# Patient Record
Sex: MALE | Race: WHITE | NOT HISPANIC OR LATINO | Employment: OTHER | URBAN - METROPOLITAN AREA
[De-identification: names, ages, dates, MRNs, and addresses within clinical notes are randomized per-mention and may not be internally consistent; named-entity substitution may affect disease eponyms.]

---

## 2019-01-08 ENCOUNTER — OFFICE VISIT (OUTPATIENT)
Dept: OTOLARYNGOLOGY | Facility: CLINIC | Age: 69
End: 2019-01-08
Payer: COMMERCIAL

## 2019-01-08 VITALS
WEIGHT: 167.8 LBS | DIASTOLIC BLOOD PRESSURE: 82 MMHG | BODY MASS INDEX: 24.02 KG/M2 | HEIGHT: 70 IN | SYSTOLIC BLOOD PRESSURE: 142 MMHG

## 2019-01-08 DIAGNOSIS — H61.23 BILATERAL IMPACTED CERUMEN: ICD-10-CM

## 2019-01-08 DIAGNOSIS — H91.91 HEARING LOSS OF RIGHT EAR, UNSPECIFIED HEARING LOSS TYPE: Primary | ICD-10-CM

## 2019-01-08 DIAGNOSIS — H90.3 SENSORINEURAL HEARING LOSS (SNHL), BILATERAL: ICD-10-CM

## 2019-01-08 PROCEDURE — 99203 OFFICE O/P NEW LOW 30 MIN: CPT | Performed by: SPECIALIST

## 2019-01-08 PROCEDURE — 69210 REMOVE IMPACTED EAR WAX UNI: CPT | Performed by: NURSE PRACTITIONER

## 2019-01-08 RX ORDER — AMLODIPINE BESYLATE AND BENAZEPRIL HYDROCHLORIDE 10; 40 MG/1; MG/1
CAPSULE ORAL
COMMUNITY
Start: 2018-11-10

## 2019-01-08 RX ORDER — PHENOL 1.4 %
600 AEROSOL, SPRAY (ML) MUCOUS MEMBRANE 2 TIMES DAILY WITH MEALS
COMMUNITY

## 2019-01-08 RX ORDER — ETODOLAC 500 MG/1
TABLET, FILM COATED ORAL
COMMUNITY
Start: 2019-01-08

## 2019-01-08 RX ORDER — SIMVASTATIN 20 MG
TABLET ORAL
COMMUNITY
Start: 2018-10-18

## 2019-01-08 RX ORDER — MULTIVIT-MIN/IRON FUM/FOLIC AC 7.5 MG-4
1 TABLET ORAL DAILY
COMMUNITY

## 2019-01-08 NOTE — ASSESSMENT & PLAN NOTE
Bilateral cerumen impaction removed with alligator forceps and suction  Follow up annually for cerumen maintenance

## 2019-01-08 NOTE — PROGRESS NOTES
Assessment/Plan:    Sensorineural hearing loss (SNHL), bilateral  Continue follow up hearing aid care with [facility]   Audiogram if symptoms worsened  Bilateral impacted cerumen  Bilateral cerumen impaction removed with alligator forceps and suction  Follow up annually for cerumen maintenance  Diagnoses and all orders for this visit:    Hearing loss of right ear, unspecified hearing loss type  -     Ambulatory referral to Audiology    Sensorineural hearing loss (SNHL), bilateral    Bilateral impacted cerumen    Other orders  -     amLODIPine-benazepril (LOTREL) 10-40 MG per capsule;   -     simvastatin (ZOCOR) 20 mg tablet;   -     etodolac (LODINE) 500 MG tablet;   -     calcium carbonate (OS-KATIA) 600 MG tablet; Take 600 mg by mouth 2 (two) times a day with meals  -     Multiple Vitamins-Minerals (MULTIVITAMIN WITH MINERALS) tablet; Take 1 tablet by mouth daily  -     Ear cerumen removal          Subjective:      Patient ID: Dale Nelson is a 70 y o  male  Presents today as a new patient due to bilateral hearing loss and right ear blocked  Obtained bilateral hearing aids through [facility] one year ago with audiogram at that time  For past few days noticed decreased hearing right ear  Yesterday ear popped and feels slightly better  No otalgia or otorrhea  No prior ear surgery  No current URI  Other ENT concerns include history of snoring with CPAP and uvulectomy years ago due to polyps on uvula  Current smoker  The following portions of the patient's history were reviewed and updated as appropriate: allergies, current medications, past family history, past medical history, past social history, past surgical history and problem list     Review of Systems   Constitutional: Negative  HENT: Positive for hearing loss  Negative for congestion, ear discharge, ear pain, nosebleeds, postnasal drip, rhinorrhea, sinus pain, sinus pressure, sore throat, tinnitus and voice change      Eyes: Negative  Respiratory: Negative for chest tightness and shortness of breath  Cardiovascular: Negative  Gastrointestinal: Negative  Endocrine: Negative  Musculoskeletal: Negative  Skin: Negative for color change  Neurological: Negative for dizziness, numbness and headaches  Psychiatric/Behavioral: Negative  Objective:      /82 (BP Location: Left arm, Patient Position: Sitting, Cuff Size: Adult)   Ht 5' 10" (1 778 m)   Wt 76 1 kg (167 lb 12 8 oz)   BMI 24 08 kg/m²          Physical Exam   Constitutional: He is oriented to person, place, and time  He appears well-developed and well-nourished  He is cooperative  HENT:   Head: Normocephalic  Right Ear: Tympanic membrane, external ear and ear canal normal  No drainage or tenderness  Tympanic membrane is not perforated and not erythematous  Decreased hearing is noted  Left Ear: Tympanic membrane, external ear and ear canal normal  No drainage or tenderness  Tympanic membrane is not perforated and not erythematous  Decreased hearing is noted  Nose: Nose normal  No sinus tenderness, nasal deformity or septal deviation  Mouth/Throat: Uvula is midline, oropharynx is clear and moist and mucous membranes are normal  Mucous membranes are not pale and not dry  No oral lesions  Normal dentition  No oropharyngeal exudate  DNS to left  Bilateral cerumen impaction     Neck: Normal range of motion and full passive range of motion without pain  Neck supple  No tracheal deviation present  Cardiovascular: Normal rate  Pulmonary/Chest: Effort normal  No accessory muscle usage  No respiratory distress  Musculoskeletal:        Right shoulder: He exhibits normal range of motion  Lymphadenopathy:     He has no cervical adenopathy  Neurological: He is alert and oriented to person, place, and time  No cranial nerve deficit or sensory deficit  Skin: Skin is warm, dry and intact  Psychiatric: He has a normal mood and affect  Ear cerumen removal  Date/Time: 1/8/2019 2:34 PM  Performed by: Gifty Wolff  Authorized by: Gifty Luo protocol:     Procedure explained and questions answered to patient or proxy's satisfaction: yes    Procedure details:     Location:  L ear, R ear and external auditory canal    Approach:  External    Equipment used:  Suction and alligator forceps  Post-procedure details:     Complication:  None    Hearing quality:  Normal    Patient tolerance of procedure: Tolerated well, no immediate complications  Comments: On exam noted bilateral cerumen impaction and unable to fully view tympanic membrane  Cerumen impaction removed bilateral eac with alligator forceps and suction, pt tolerated procedure well  Upon removal, improved hearing and decreased clogged sensation of bilateral ears  Encourage ongoing follow up annually to monitor for cerumen and hearing  Audiogram if symptoms worsen

## 2021-12-28 ENCOUNTER — EVALUATION (OUTPATIENT)
Dept: PHYSICAL THERAPY | Facility: CLINIC | Age: 71
End: 2021-12-28
Payer: MEDICARE

## 2021-12-28 DIAGNOSIS — M19.012 PRIMARY OSTEOARTHRITIS OF LEFT SHOULDER: Primary | ICD-10-CM

## 2021-12-28 PROCEDURE — 97161 PT EVAL LOW COMPLEX 20 MIN: CPT | Performed by: PHYSICAL THERAPIST

## 2022-01-03 ENCOUNTER — OFFICE VISIT (OUTPATIENT)
Dept: PHYSICAL THERAPY | Facility: CLINIC | Age: 72
End: 2022-01-03
Payer: MEDICARE

## 2022-01-03 DIAGNOSIS — M19.012 PRIMARY OSTEOARTHRITIS OF LEFT SHOULDER: Primary | ICD-10-CM

## 2022-01-03 PROCEDURE — 97110 THERAPEUTIC EXERCISES: CPT

## 2022-01-03 PROCEDURE — 97140 MANUAL THERAPY 1/> REGIONS: CPT

## 2022-01-03 NOTE — PROGRESS NOTES
Daily Note     Today's date: 1/3/2022  Patient name: Quinn Davis  : 1950  MRN: [id number]  Referring provider: Wei Bennett MD  Dx:   Encounter Diagnosis     ICD-10-CM    1  Primary osteoarthritis of left shoulder  M19 012                   Subjective: Patient reports his shoulder pain has been very manageable, noting that he has not been wearing his sling while home but being careful not to lift anything  Objective: See treatment diary below      Assessment: Tolerated treatment well  Initiated exercises today to focus on scapular stability, PROM, and distal strength  Patient exhibited good technique with therapeutic exercises and would benefit from continued PT      Plan: Continue per plan of care        Precautions: Medical History    Diagnosis Date Comment Source   High blood pressure              Date  2022      Visit # 1 2      Manuals        PROM (avoid ER past 45 degrees)  Performed      Scap setting  15x performed                      Neuro Re-Ed        Pendulums  Fwd/bkwd 30x each      Scap squeezes  Manual                                              Ther Ex        Grippers  Blue ball 30x      Biceps curls  #2 3x10 reps LUE      Pronation/supination  #1 30x LUE      Wrist extension/flexion  #1 30x LUE      Upper trap stretch  30" x 3      FIS blue ball  20x 2"                      Ther Activity                        Gait Training                        Modalities

## 2022-01-06 ENCOUNTER — OFFICE VISIT (OUTPATIENT)
Dept: PHYSICAL THERAPY | Facility: CLINIC | Age: 72
End: 2022-01-06
Payer: MEDICARE

## 2022-01-06 DIAGNOSIS — M19.012 PRIMARY OSTEOARTHRITIS OF LEFT SHOULDER: Primary | ICD-10-CM

## 2022-01-06 PROCEDURE — 97140 MANUAL THERAPY 1/> REGIONS: CPT

## 2022-01-06 PROCEDURE — 97110 THERAPEUTIC EXERCISES: CPT

## 2022-01-06 PROCEDURE — 97112 NEUROMUSCULAR REEDUCATION: CPT

## 2022-01-06 NOTE — PROGRESS NOTES
Daily Note     Today's date: 2022  Patient name: Robina Alfaro  : 1950  MRN: [id number]  Referring provider: Bart Mccord MD  Dx:   Encounter Diagnosis     ICD-10-CM    1  Primary osteoarthritis of left shoulder  M19 012                   Subjective: Patient reports he was slightly sore following last session but no pain  Patient states he continues to feel improvement in shoulder mobility following stretches  Objective: See treatment diary below      Assessment: Tolerated treatment well  Continued exercise progression from last visit as per protocol  Patient exhibited good technique with therapeutic exercises and would benefit from continued PT      Plan: Continue per plan of care        Precautions: Medical History    Diagnosis Date Comment Source   High blood pressure              Date  2022     Visit # 1 2 3     Manuals   15     PROM (avoid ER past 45 degrees)  Performed Performed     Scap setting  15x performed 20x                     Neuro Re-Ed   10     Pendulums  Fwd/bkwd 30x each CCW/CW 30x each     Scap squeezes  Manual Manual                                             Ther Ex   20     Grippers  Blue ball 30x 2x blue ball 30x      Biceps curls  #2 3x10 reps LUE #2 3x10 reps LUE     Pronation/supination  #1 30x LUE #3 30x LUE     Wrist extension/flexion  #1 30x LUE #3 30x LUE     Upper trap stretch  30" x 3 ---     FIS blue ball  20x 2" 20x 2"     AAROM shld flex   W/cane in supine 10"x10             Ther Activity                        Gait Training                        Modalities

## 2022-01-10 ENCOUNTER — OFFICE VISIT (OUTPATIENT)
Dept: PHYSICAL THERAPY | Facility: CLINIC | Age: 72
End: 2022-01-10
Payer: MEDICARE

## 2022-01-10 DIAGNOSIS — M19.012 PRIMARY OSTEOARTHRITIS OF LEFT SHOULDER: Primary | ICD-10-CM

## 2022-01-10 PROCEDURE — 97110 THERAPEUTIC EXERCISES: CPT

## 2022-01-10 PROCEDURE — 97140 MANUAL THERAPY 1/> REGIONS: CPT

## 2022-01-10 NOTE — PROGRESS NOTES
Daily Note     Today's date: 1/10/2022  Patient name: Vinicius Locke  : 1950  MRN: [id number]  Referring provider: Barney Ayala MD  Dx:   Encounter Diagnosis     ICD-10-CM    1  Primary osteoarthritis of left shoulder  M19 012                   Subjective: Patient report some soreness because of the cold weather however notes overall he is feeling quite a bit better  Patient notes that he will be following up with his surgeon on Friday  Objective: See treatment diary below      Assessment: Tolerated treatment well  Patient exhibits good mobility within post operative restrictions  Patient exhibited good technique with therapeutic exercises and would benefit from continued PT      Plan: Continue per plan of care        Precautions: Medical History    Diagnosis Date Comment Source   High blood pressure              Date  2022 2022 01/10/2022    Visit # 1 2 3 3    Manuals   15 15    PROM (avoid ER past 45 degrees)  Performed Performed Performed    Scap setting  15x performed 20x 20x                    Neuro Re-Ed   10 10    Pendulums  Fwd/bkwd 30x each CCW/CW 30x each CCW/CW 30x each    Scap squeezes  Manual Manual 2x10                                            Ther Ex   20 20    Grippers  Blue ball 30x 2x blue ball 30x  2x blue ball 30x     Biceps curls  #2 3x10 reps LUE #2 3x10 reps LUE #2 3x10 reps LUE    Pronation/supination  #1 30x LUE #3 30x LUE #3 30x LUE    Wrist extension/flexion  #1 30x LUE #3 30x LUE #3 30x LUE    Upper trap stretch  30" x 3 --- ---    FIS blue ball  20x 2" 20x 2" 15x 5" hold    AAROM shld flex   W/cane in supine 10"x10 W/cane in supine 10"x10    Low row    #0 2x10    Ther Activity                        Gait Training                        Modalities

## 2022-01-13 ENCOUNTER — APPOINTMENT (OUTPATIENT)
Dept: PHYSICAL THERAPY | Facility: CLINIC | Age: 72
End: 2022-01-13
Payer: MEDICARE

## 2022-01-17 ENCOUNTER — OFFICE VISIT (OUTPATIENT)
Dept: PHYSICAL THERAPY | Facility: CLINIC | Age: 72
End: 2022-01-17
Payer: MEDICARE

## 2022-01-17 DIAGNOSIS — M19.012 PRIMARY OSTEOARTHRITIS OF LEFT SHOULDER: Primary | ICD-10-CM

## 2022-01-17 PROCEDURE — 97140 MANUAL THERAPY 1/> REGIONS: CPT

## 2022-01-17 PROCEDURE — 97112 NEUROMUSCULAR REEDUCATION: CPT

## 2022-01-17 PROCEDURE — 97110 THERAPEUTIC EXERCISES: CPT

## 2022-01-17 NOTE — PROGRESS NOTES
Daily Note     Today's date: 2022  Patient name: Vinicius Locke  : 1950  MRN: [id number]  Referring provider: Barney Ayala MD  Dx:   Encounter Diagnosis     ICD-10-CM    1  Primary osteoarthritis of left shoulder  M19 012        Start Time: 1056          Subjective: I saw the Dr last week  & he was pleased with my progress  I see him again in 6 weeks  Objective: See treatment diary below      Assessment: Tolerated treatment fair  Patient demonstrated fatigue post treatment, exhibited good technique with therapeutic exercises and would benefit from continued PT      Plan: Progress treament per protocol  Precautions: Medical History; new script 22 - ROM, strengthening, No restrictions      Diagnosis Date Comment Source   High blood pressure              Date  2022 2022 01/10/2022 1/17   Visit # 1 2 3 4 5   Manuals   15 15    PROM (avoid ER past 45 degrees)  Performed Performed Performed perf as tolerated   Scap setting  15x performed 20x 20x scap re ed in R S/L                   Neuro Re-Ed   10 10    Pendulums  Fwd/bkwd 30x each CCW/CW 30x each CCW/CW 30x each ---   Scap squeezes  Manual Manual 2x10 10x 5 sec hold supine                                           Ther Ex   20 20    Grippers  Blue ball 30x 2x blue ball 30x  2x blue ball 30x  ---   Biceps curls  #2 3x10 reps LUE #2 3x10 reps LUE #2 3x10 reps LUE 3# x 20 reps    Pronation/supination  #1 30x LUE #3 30x LUE #3 30x LUE -----   Wrist extension/flexion  #1 30x LUE #3 30x LUE #3 30x LUE ----   Upper trap stretch  30" x 3 --- --- ----   FIS blue ball  20x 2" 20x 2" 15x 5" hold 15 x 5 sec hol d   AAROM shld flex   W/cane in supine 10"x10 W/cane in supine 10"x10 Pulleys 10 x 10 sec hold    Low row    #0 2x10 2x10 reps 2#    TB rows      Red x 20 reps    TB extension     Red x 20 reps            Gait Training                        Modalities

## 2022-01-20 ENCOUNTER — APPOINTMENT (OUTPATIENT)
Dept: PHYSICAL THERAPY | Facility: CLINIC | Age: 72
End: 2022-01-20
Payer: MEDICARE

## 2022-01-24 ENCOUNTER — OFFICE VISIT (OUTPATIENT)
Dept: PHYSICAL THERAPY | Facility: CLINIC | Age: 72
End: 2022-01-24
Payer: MEDICARE

## 2022-01-24 DIAGNOSIS — M19.012 PRIMARY OSTEOARTHRITIS OF LEFT SHOULDER: Primary | ICD-10-CM

## 2022-01-24 PROCEDURE — 97140 MANUAL THERAPY 1/> REGIONS: CPT

## 2022-01-24 PROCEDURE — 97110 THERAPEUTIC EXERCISES: CPT

## 2022-01-24 PROCEDURE — 97112 NEUROMUSCULAR REEDUCATION: CPT

## 2022-01-24 NOTE — PROGRESS NOTES
Daily Note     Today's date: 2022  Patient name: Lizz Schulz  : 1950  MRN: [id number]  Referring provider: Andi Burnett MD  Dx:   Encounter Diagnosis     ICD-10-CM    1  Primary osteoarthritis of left shoulder  M19 012                   Subjective: Patient reports he continues to use his left shoulder for ADLs with no issues  Objective: See treatment diary below      Assessment: Tolerated treatment well  Continued advancement of program as per tolerance today  Patient demonstrated fatigue post treatment, exhibited good technique with therapeutic exercises and would benefit from continued PT      Plan: Progress treament per protocol  Precautions: Medical History; new script 22 - ROM, strengthening, No restrictions      Diagnosis Date Comment Source   High blood pressure              Date 2022       Visit # 6       Manuals 10       PROM (avoid ER past 45 degrees) perf as tolerated       Scap setting scap re ed in R S/L                       Neuro Re-Ed 15       Pendulums ---       Scap squeezes 10x 5 sec hold supine                                               Ther Ex 20       Grippers ---       Biceps curls 3# x 20 reps        Pronation/supination ---       Wrist extension/flexion ---       Upper trap stretch ---       FIS blue ball 15 x 5 sec hold       AAROM shld flex Pulleys 10 x 10 sec hold        Low row 2x10 reps 2#        Rows Blue 2x10 reps       TB extension Red 2x10 reps       Scaption        Shld abd in sidelying #0 2x10 in R s/l       Shld iso ER 5" hold 5x               Gait Training                        Modalities

## 2022-01-27 ENCOUNTER — OFFICE VISIT (OUTPATIENT)
Dept: PHYSICAL THERAPY | Facility: CLINIC | Age: 72
End: 2022-01-27
Payer: MEDICARE

## 2022-01-27 DIAGNOSIS — M19.012 PRIMARY OSTEOARTHRITIS OF LEFT SHOULDER: Primary | ICD-10-CM

## 2022-01-27 PROCEDURE — 97140 MANUAL THERAPY 1/> REGIONS: CPT

## 2022-01-27 PROCEDURE — 97110 THERAPEUTIC EXERCISES: CPT

## 2022-01-27 PROCEDURE — 97112 NEUROMUSCULAR REEDUCATION: CPT

## 2022-01-27 NOTE — PROGRESS NOTES
Daily Note     Today's date: 2022  Patient name: Tom Marcos  : 1950  MRN: [id number]  Referring provider: Bisi Schultz MD  Dx:   Encounter Diagnosis     ICD-10-CM    1  Primary osteoarthritis of left shoulder  M19 012                   Subjective: Patient reports no increased soreness with new exercise advancement from last session  Patient states he is pleased with his continue progress  Objective: See treatment diary below      Assessment: Tolerated treatment well  Continued advancement of program as per tolerance today  Patient demonstrated fatigue post treatment, exhibited good technique with therapeutic exercises and would benefit from continued PT      Plan: Progress treament per protocol  Precautions: Medical History; new script 22 - ROM, strengthening, No restrictions      Diagnosis Date Comment Source   High blood pressure              Date 2022      Visit # 6 7      Manuals 10 10      PROM (avoid ER past 45 degrees) perf as tolerated perf as tolerated      Scap setting scap re ed in R S/L scap re ed in R S/L                      Neuro Re-Ed 15 15      Pendulums --- ---      Scap squeezes 10x 5 sec hold supine 10x 5 sec hold supine                                              Ther Ex 20 20      Grippers --- ---      Biceps curls 3# x 20 reps  3# x 20 reps       Pronation/supination ---       Wrist extension/flexion ---       Upper trap stretch ---       FIS blue ball 15 x 5 sec hold 15 x 5 sec hold      AAROM shld flex Pulleys 10 x 10 sec hold  Pulleys 10 x 10 sec hold       Low row 2x10 reps 2#  2x10 reps 2#       Rows Blue 2x10 reps Blue 2x10 reps      TB extension Red 2x10 reps Red 2x10 reps      Scaption  #1 2x10      Shld abd in sidelying #0 2x10 in R s/l #0 2x10 in R s/l      Shld iso ER 5" hold 5x 5" hold 5x      Ball on wall  Crosses 2x10              Gait Training                        Modalities

## 2022-01-31 ENCOUNTER — OFFICE VISIT (OUTPATIENT)
Dept: PHYSICAL THERAPY | Facility: CLINIC | Age: 72
End: 2022-01-31
Payer: MEDICARE

## 2022-01-31 DIAGNOSIS — M19.012 PRIMARY OSTEOARTHRITIS OF LEFT SHOULDER: Primary | ICD-10-CM

## 2022-01-31 PROCEDURE — 97140 MANUAL THERAPY 1/> REGIONS: CPT

## 2022-01-31 PROCEDURE — 97110 THERAPEUTIC EXERCISES: CPT

## 2022-01-31 PROCEDURE — 97112 NEUROMUSCULAR REEDUCATION: CPT

## 2022-02-02 ENCOUNTER — OFFICE VISIT (OUTPATIENT)
Dept: PHYSICAL THERAPY | Facility: CLINIC | Age: 72
End: 2022-02-02
Payer: MEDICARE

## 2022-02-02 DIAGNOSIS — M19.012 PRIMARY OSTEOARTHRITIS OF LEFT SHOULDER: Primary | ICD-10-CM

## 2022-02-02 PROCEDURE — 97110 THERAPEUTIC EXERCISES: CPT

## 2022-02-02 PROCEDURE — 97140 MANUAL THERAPY 1/> REGIONS: CPT

## 2022-02-02 PROCEDURE — 97112 NEUROMUSCULAR REEDUCATION: CPT

## 2022-02-02 NOTE — PROGRESS NOTES
Daily Note     Today's date: 2022  Patient name: Nadine Cano  : 1950  MRN: [id number]  Referring provider: Radha Olivo MD  Dx:   Encounter Diagnosis     ICD-10-CM    1  Primary osteoarthritis of left shoulder  M19 012                   Subjective: Both my shoulders are still sore from shoveling snow over the weekend  Objective: See treatment diary below      Assessment: Tolerated treatment fair  Patient demonstrated fatigue post treatment, exhibited good technique with therapeutic exercises and would benefit from continued PT      Plan: Progress treatment as tolerated  Precautions: Medical History; new script 22 - ROM, strengthening, No restrictions      Diagnosis Date Comment Source   High blood pressure              Date 2022    Visit # 6 7 8 9    Manuals 10 10      PROM (avoid ER past 45 degrees) perf as tolerated perf as tolerated perf as sarah perf    Scap setting scap re ed in R S/L scap re ed in R S/L scap re ed in R S/L scap re ed in R S/L                    Neuro Re-Ed 15 15      Pendulums --- --- ----     Scap squeezes 10x 5 sec hold supine 10x 5 sec hold supine [id number] hold  (supine) 10x 5 sec hold (supine)                                            Ther Ex 20 20      Grippers --- ---      Biceps curls 3# x 20 reps  3# x 20 reps  3# x 20 reps  3# x 20 reps     Pronation/supination ---  --- ---    Wrist extension/flexion ---  --- ---    Upper trap stretch ---  ---- ----    FIS blue ball 15 x 5 sec hold 15 x 5 sec hold 20x 5sec hold  20 x 5 sec hold     AAROM shld flex Pulleys 10 x 10 sec hold  Pulleys 10 x 10 sec hold  10 x 10 sec hold  10 x10sec hold     Low row 2x10 reps 2#  2x10 reps 2#  2# 2 x 10 reps  2# x 20 reps - row / ext    Rows Blue 2x10 reps Blue 2x10 reps Blue x 20 reps  Blue x 20 reps against 1/2 foam    TB extension Red 2x10 reps Red 2x10 reps Red 2 x 10 reps  Red x 20 reps against 1/2 foam    Scaption  #1 2x10 1# 2 x 10 reps  0# - 2 x 10 reps     Shld abd in sidelying #0 2x10 in R s/l #0 2x10 in R s/l R S/L - L ABD 2x10 reps  10x    Shld iso ER 5" hold 5x 5" hold 5x 10 x 5 sec hold  5 x 5sec     Ball on wall  Crosses 2x10 2x10 reps  2x10 reps        Supine AAROM shld flex holding small red ball x 10 reps  2x10 reps     Gait Training                        Modalities

## 2022-02-03 ENCOUNTER — APPOINTMENT (OUTPATIENT)
Dept: PHYSICAL THERAPY | Facility: CLINIC | Age: 72
End: 2022-02-03
Payer: MEDICARE

## 2022-02-07 ENCOUNTER — OFFICE VISIT (OUTPATIENT)
Dept: PHYSICAL THERAPY | Facility: CLINIC | Age: 72
End: 2022-02-07
Payer: MEDICARE

## 2022-02-07 DIAGNOSIS — M19.012 PRIMARY OSTEOARTHRITIS OF LEFT SHOULDER: Primary | ICD-10-CM

## 2022-02-07 PROCEDURE — 97112 NEUROMUSCULAR REEDUCATION: CPT

## 2022-02-07 PROCEDURE — 97110 THERAPEUTIC EXERCISES: CPT

## 2022-02-07 PROCEDURE — 97140 MANUAL THERAPY 1/> REGIONS: CPT

## 2022-02-07 NOTE — PROGRESS NOTES
Daily Note     Today's date: 2022  Patient name: Tom Marcos  : 1950  MRN: [id number]  Referring provider: Bisi Schultz MD  Dx:   Encounter Diagnosis     ICD-10-CM    1  Primary osteoarthritis of left shoulder  M19 012        Start Time: 1055          Subjective: I am feeling stronger  Objective: See treatment diary below      Assessment: Tolerated treatment fair  Patient demonstrated fatigue post treatment, exhibited good technique with therapeutic exercises and would benefit from continued PT      Plan: Progress treatment as tolerated  Precautions: Medical History; new script 22 - ROM, strengthening, No restrictions      Diagnosis Date Comment Source   High blood pressure              Date 2022   Visit # 6 7 8 9 10   Manuals 10 10      PROM (avoid ER past 45 degrees) perf as tolerated perf as tolerated perf as sarah perf perf   Scap setting scap re ed in R S/L scap re ed in R S/L scap re ed in R S/L scap re ed in R S/L 10 x 5 sec hold                    Neuro Re-Ed 15 15      Pendulums --- --- ----  -----   Scap squeezes 10x 5 sec hold supine 10x 5 sec hold supine [id number] hold  (supine) 10x 5 sec hold (supine) 10x 5sec hold                                            Ther Ex 20 20      Grippers --- ---      Biceps curls 3# x 20 reps  3# x 20 reps  3# x 20 reps  3# x 20 reps  3# x 20 reps   Pronation/supination ---  --- --- ----   Wrist extension/flexion ---  --- --- ---   Upper trap stretch ---  ---- ---- ---   FIS blue ball 15 x 5 sec hold 15 x 5 sec hold 20x 5sec hold  20 x 5 sec hold  20x   AAROM shld flex Pulleys 10 x 10 sec hold  Pulleys 10 x 10 sec hold  10 x 10 sec hold  10 x10sec hold  ----   Low row 2x10 reps 2#  2x10 reps 2#  2# 2 x 10 reps  2# x 20 reps - row / ext 2# x 20 reps row/ ext   Rows Blue 2x10 reps Blue 2x10 reps Blue x 20 reps  Blue x 20 reps against 1/2 foam Blue x 20 reps against 1/2 foam    TB extension Red 2x10 reps Red 2x10 reps Red 2 x 10 reps  Red x 20 reps against 1/2 foam Red x 20 reps against 1/2 foam    Scaption  #1 2x10 1# 2 x 10 reps  0# - 2 x 10 reps  0# - 2 x 10 reps    Shld abd in sidelying #0 2x10 in R s/l #0 2x10 in R s/l R S/L - L ABD 2x10 reps  10x 2x10 reps    Shld iso ER 5" hold 5x 5" hold 5x 10 x 5 sec hold  5 x 5sec  ---   Ball on wall  Crosses 2x10 2x10 reps  2x10 reps  ---      Supine AAROM shld flex holding small red ball x 10 reps  2x10 reps  2x10 reps    Gait Training                        Modalities

## 2022-02-10 ENCOUNTER — OFFICE VISIT (OUTPATIENT)
Dept: PHYSICAL THERAPY | Facility: CLINIC | Age: 72
End: 2022-02-10
Payer: MEDICARE

## 2022-02-10 DIAGNOSIS — M19.012 PRIMARY OSTEOARTHRITIS OF LEFT SHOULDER: Primary | ICD-10-CM

## 2022-02-10 PROCEDURE — 97140 MANUAL THERAPY 1/> REGIONS: CPT

## 2022-02-10 PROCEDURE — 97110 THERAPEUTIC EXERCISES: CPT

## 2022-02-10 PROCEDURE — 97112 NEUROMUSCULAR REEDUCATION: CPT

## 2022-02-10 NOTE — PROGRESS NOTES
Daily Note     Today's date: 2/10/2022  Patient name: Dale Nelson  : 1950  MRN: [id number]  Referring provider: Shahrzad Vieira MD  Dx:   Encounter Diagnosis     ICD-10-CM    1  Primary osteoarthritis of left shoulder  M19 012        Start Time: 1015          Subjective: Overhead reaching is still difficult  Objective: See treatment diary below      Assessment: Tolerated treatment well  Patient demonstrated fatigue post treatment, exhibited good technique with therapeutic exercises and would benefit from continued PT      Plan: Progress treatment as tolerated  Precautions: Medical History; new script 22 - ROM, strengthening, No restrictions      Diagnosis Date Comment Source   High blood pressure              Date 2022 2022 1/31 2/2 2/10   Visit # 6 7 8 9 11   Manuals 10 10      PROM (avoid ER past 45 degrees) perf as tolerated perf as tolerated perf as sarah perf perf   Scap setting scap re ed in R S/L scap re ed in R S/L scap re ed in R S/L scap re ed in R S/L 10 x 5 sec hold                    Neuro Re-Ed 15 15      Pendulums --- --- ----  -----   Scap squeezes 10x 5 sec hold supine 10x 5 sec hold supine [id number] hold  (supine) 10x 5 sec hold (supine) 10x 5sec hold                                            Ther Ex 20 20      Grippers --- ---      Biceps curls 3# x 20 reps  3# x 20 reps  3# x 20 reps  3# x 20 reps  3# x 20 reps   Pronation/supination ---  --- --- ----   Wrist extension/flexion ---  --- --- ---   Upper trap stretch ---  ---- ---- ---   FIS blue ball 15 x 5 sec hold 15 x 5 sec hold 20x 5sec hold  20 x 5 sec hold  20x   AAROM shld flex Pulleys 10 x 10 sec hold  Pulleys 10 x 10 sec hold  10 x 10 sec hold  10 x10sec hold  ----   Low row 2x10 reps 2#  2x10 reps 2#  2# 2 x 10 reps  2# x 20 reps - row / ext 2# x 20 reps row/ ext   Rows Blue 2x10 reps Blue 2x10 reps Blue x 20 reps  Blue x 20 reps against 1/2 foam Blue x 20 reps against 1/2 foam    TB extension Red 2x10 reps Red 2x10 reps Red 2 x 10 reps  Red x 20 reps against 1/2 foam Red x 20 reps against 1/2 foam    Scaption  #1 2x10 1# 2 x 10 reps  0# - 2 x 10 reps  0# - 2 x 10 reps    Shld abd in sidelying #0 2x10 in R s/l #0 2x10 in R s/l R S/L - L ABD 2x10 reps  10x 2x10 reps    Shld iso ER 5" hold 5x 5" hold 5x 10 x 5 sec hold  5 x 5sec  ---   Ball on wall  Crosses 2x10 2x10 reps  2x10 reps  ---      Supine AAROM shld flex holding small red ball x 10 reps  2x10 reps  2x10 reps    Gait Training                        Modalities

## 2022-02-14 ENCOUNTER — OFFICE VISIT (OUTPATIENT)
Dept: PHYSICAL THERAPY | Facility: CLINIC | Age: 72
End: 2022-02-14
Payer: MEDICARE

## 2022-02-14 DIAGNOSIS — M19.012 PRIMARY OSTEOARTHRITIS OF LEFT SHOULDER: Primary | ICD-10-CM

## 2022-02-14 PROCEDURE — 97110 THERAPEUTIC EXERCISES: CPT

## 2022-02-14 NOTE — PROGRESS NOTES
Daily Note     Today's date: 2022  Patient name: Hedy Johnson  : 1950  MRN: [id number]  Referring provider: Roberto Blake MD  Dx:   Encounter Diagnosis     ICD-10-CM    1  Primary osteoarthritis of left shoulder  M19 012                   Subjective: Patient reports continued progressions in function  Patient states that he has been using his left arm for more daily tasks and ADLs, patient however notes some difficulty persists with overhead reaching described as weakness  Objective: See treatment diary below      Assessment: Tolerated treatment well  Patient continues to have soreness with shoulder external rotation, advised patient to limit painful ranges and focus on pain free ranges  Patient demonstrated fatigue post treatment, exhibited good technique with therapeutic exercises and would benefit from continued PT      Plan: Progress treatment as tolerated  Precautions: Medical History; new script 22 - ROM, strengthening, No restrictions      Diagnosis Date Comment Source   High blood pressure              Date 2022   2/2 2/10   Visit # 12   9 11   Manuals        PROM (avoid ER past 45 degrees)    perf perf   Scap setting    scap re ed in R S/L 10 x 5 sec hold                    Neuro Re-Ed        Pendulums     -----   Scap squeezes 10x 5sec hold    10x 5 sec hold (supine) 10x 5sec hold                                            Ther Ex        Grippers        Biceps curls 3# x 20 reps   3# x 20 reps  3# x 20 reps   Pronation/supination    --- ----   Wrist extension/flexion    --- ---   Upper trap stretch    ---- ---   FIS blue ball 15 x 5 sec hold   20 x 5 sec hold  20x   AAROM shld flex Pulleys 10 x 10 sec hold    10 x10sec hold  ----   Low row 2# x 20 reps row/ ext   2# x 20 reps - row / ext 2# x 20 reps row/ ext   Rows Blue x 20 reps against 1/2 foam    Blue x 20 reps against 1/2 foam Blue x 20 reps against 1/2 foam    TB extension Red x 20 reps against 1/2 foam    Red x 20 reps against 1/2 foam Red x 20 reps against 1/2 foam    Scaption 1# - 2 x 10 reps    0# - 2 x 10 reps  0# - 2 x 10 reps    Shld abd in sidelying 2x10 reps    10x 2x10 reps    Shld iso ER S/L shld ER   5 x 5sec  ---   Ball on wall 2x10 reps    2x10 reps  ---   Supine AAROM 2x10 reps with red ball   2x10 reps  2x10 reps    Gait Training                        Modalities

## 2022-02-17 ENCOUNTER — OFFICE VISIT (OUTPATIENT)
Dept: PHYSICAL THERAPY | Facility: CLINIC | Age: 72
End: 2022-02-17
Payer: MEDICARE

## 2022-02-17 DIAGNOSIS — M19.012 PRIMARY OSTEOARTHRITIS OF LEFT SHOULDER: Primary | ICD-10-CM

## 2022-02-17 PROCEDURE — 97110 THERAPEUTIC EXERCISES: CPT

## 2022-02-17 NOTE — PROGRESS NOTES
Daily Note     Today's date: 2022  Patient name: Maxene Schirmer  : 1950  MRN: [id number]  Referring provider: Tom Sanchez MD  Dx:   Encounter Diagnosis     ICD-10-CM    1  Primary osteoarthritis of left shoulder  M19 012                   Subjective: Patient reports no new complaints today  Objective: See treatment diary below      Assessment: Tolerated treatment well  Exercises continued to progress, external rotation performed limiting painful ranges  Patient demonstrated fatigue post treatment, exhibited good technique with therapeutic exercises and would benefit from continued PT      Plan: Progress treatment as tolerated  Precautions: Medical History; new script 22 - ROM, strengthening, No restrictions      Diagnosis Date Comment Source   High blood pressure              Date 2022 2022  2/2 2/10   Visit # 12 13  9 11   Manuals        PROM (avoid ER past 45 degrees)    perf perf   Scap setting    scap re ed in R S/L 10 x 5 sec hold                    Neuro Re-Ed        Pendulums     -----   Scap squeezes 10x 5sec hold  10x 5sec hold   10x 5 sec hold (supine) 10x 5sec hold                                            Ther Ex        Grippers        Biceps curls 3# x 20 reps 3# x 20 reps  3# x 20 reps  3# x 20 reps   Pronation/supination    --- ----   Wrist extension/flexion    --- ---   Upper trap stretch    ---- ---   FIS blue ball 15 x 5 sec hold 15 x 5 sec hold  20 x 5 sec hold  20x   AAROM shld flex Pulleys 10 x 10 sec hold  Pulleys 10 x 10 sec hold   10 x10sec hold  ----   Low row 2# x 20 reps row/ ext 3# x 20 reps row/ ext  2# x 20 reps - row / ext 2# x 20 reps row/ ext   Rows Blue x 20 reps against 1/2 foam  Blue x 20 reps against 1/2 foam   Blue x 20 reps against 1/2 foam Blue x 20 reps against 1/2 foam    TB extension Red x 20 reps against 1/2 foam  #8 @vanna  Red x 20 reps against 1/2 foam Red x 20 reps against 1/2 foam    Scaption 1# - 2 x 10 reps  1# - 2 x 10 reps   0# - 2 x 10 reps  0# - 2 x 10 reps    Shld abd in sidelying 2x10 reps  2x10 reps   10x 2x10 reps    Shld iso ER S/L shld ER S/L shld ER  5 x 5sec  ---   Ball on wall 2x10 reps  2x10 reps   2x10 reps  ---   Supine AAROM 2x10 reps with red ball 2x10 reps with red ball  2x10 reps  2x10 reps    Gait Training                        Modalities

## 2022-02-21 ENCOUNTER — EVALUATION (OUTPATIENT)
Dept: PHYSICAL THERAPY | Facility: CLINIC | Age: 72
End: 2022-02-21
Payer: MEDICARE

## 2022-02-21 DIAGNOSIS — M19.012 PRIMARY OSTEOARTHRITIS OF LEFT SHOULDER: Primary | ICD-10-CM

## 2022-02-21 PROCEDURE — 97110 THERAPEUTIC EXERCISES: CPT

## 2022-02-21 PROCEDURE — 97140 MANUAL THERAPY 1/> REGIONS: CPT

## 2022-02-21 NOTE — PROGRESS NOTES
PT Re-Evaluation     Today's date: 2022  Patient name: Almira Duverney  : 1950  MRN: [id number]  Referring provider: Jaspreet Roe MD  Dx:   Encounter Diagnosis     ICD-10-CM    1  Primary osteoarthritis of left shoulder  M19 012                   Assessment  Assessment details: 2022: Since initial evaluation patient has progressed well through his post operative protocol and has shown significant increased in AROM into shoulder flexion, abduction, and ER  Patient has been consistent with attendance to therapy, motivated during each treatment session, and shows compliance with her HEP  At this time patient continues to exhibit strength deficits into shoulder elevation limiting function  I E : Gurjit Donnelly with signs and symptoms consistent with Primary osteoarthritis of left shoulder  (primary encounter diagnosis), with loss of range of motion, strength and spinal stabilization  Presents with high reactivity  Almira Duverney would benefit with physical therapy to address these impairments to return to prior level of function    Impairments: abnormal or restricted ROM, activity intolerance, impaired physical strength, pain with function and scapular dyskinesis    Symptom irritability: lowUnderstanding of Dx/Px/POC: good   Prognosis: good    Goals  STG  Initiate HEP  Able to reach to shoulder level in 3 weeks  LTG  Independent with HEP  Able to reach overhead in 6 weeks  FOTO > 53 in 6 weeks    Plan  Patient would benefit from: skilled physical therapy  Planned therapy interventions: manual therapy, joint mobilization, neuromuscular re-education, patient education, strengthening, stretching, therapeutic exercise, functional ROM exercises and home exercise program  Frequency: 2x week  Duration in visits: 12  Duration in weeks: 6  Treatment plan discussed with: patient        Subjective Evaluation    History of Present Illness  Date of surgery: 2021  Mechanism of injury: surgery  Mechanism of injury: 2022: Patient reports he feels significant improvement in strength, mobility, and overall function since initiation of therapy and following surgery  Patient states that he is pleased with his progress thus far  Patient notes he does continue to have strength deficits when reaching out to the side, as well as pain moving into that motion  Patient states he has a follow up with his MD tomorrow  Initial evaluation: Patient reports having total shoulder replacement on 21 by Dr Sabina Rosenbaum  Wore sling sleeping for 8 days, and for 3 days post surgery            Recurrent probem    Quality of life: good    Pain  Current pain ratin  At best pain ratin  At worst pain rating: 3  Location: left shoulder  Quality: dull ache  Relieving factors: change in position  Aggravating factors: overhead activity  Progression: improved    Treatments  Current treatment: physical therapy  Patient Goals  Patient goals for therapy: decreased pain, increased motion, increased strength, independence with ADLs/IADLs and return to sport/leisure activities          Objective     Passive Range of Motion   Left Shoulder   Flexion: 130 degrees   Abduction: 90 degrees   External rotation 0°: 45 degrees     Right Shoulder   Flexion: 170 degrees   Abduction: 160 degrees   External rotation 0°: 75 degrees     Strength/Myotome Testing     Left Shoulder     Planes of Motion   Flexion: 3+   Abduction: 3   External rotation at 0°: 4     Right Shoulder     Planes of Motion   Flexion: 5   Abduction: 5   External rotation at 0°: 5              Precautions: Medical History    Diagnosis Date Comment Source   High blood pressure          Date 2022 2022 2022  2/10   Visit # 12 13 14  11   Manuals        PROM (avoid ER past 45 degrees)     perf   Scap setting     10 x 5 sec hold    Isometric shld ER and flexion manual resistance   Performed             Neuro Re-Ed        Pendulums     -----   Scap squeezes 10x 5sec hold  10x 5sec hold  10x 5sec hold   10x 5sec hold                                            Ther Ex        Grippers        Biceps curls 3# x 20 reps 3# x 20 reps 3# x 20 reps  3# x 20 reps   Pronation/supination     ----   Wrist extension/flexion     ---   Upper trap stretch     ---   FIS blue ball 15 x 5 sec hold 15 x 5 sec hold 15 x 5 sec hold  20x   AAROM shld flex Pulleys 10 x 10 sec hold  Pulleys 10 x 10 sec hold  Pulleys 10 x 10 sec hold   ----   Low row 2# x 20 reps row/ ext 3# x 20 reps row/ ext 3# x 20 reps row/ ext  2# x 20 reps row/ ext   Rows Blue x 20 reps against 1/2 foam  Blue x 20 reps against 1/2 foam  Blue x 20 reps against 1/2 foam   Blue x 20 reps against 1/2 foam    TB extension Red x 20 reps against 1/2 foam  #8 @vanna #8 @vanna  Red x 20 reps against 1/2 foam    Scaption 1# - 2 x 10 reps  1# - 2 x 10 reps  1# - 2 x 10 reps   0# - 2 x 10 reps    Shld abd in sidelying 2x10 reps  2x10 reps  2x10 reps   2x10 reps    Shld iso ER S/L shld ER S/L shld ER S/L shld ER  ---   Ball on wall 2x10 reps  2x10 reps  2x10 reps   ---   Supine AAROM 2x10 reps with red ball 2x10 reps with red ball ---  2x10 reps    Gait Training                        Modalities

## 2024-04-29 NOTE — PROGRESS NOTES
Daily Note     Today's date: 2022  Patient name: Liset Chance  : 1950  MRN: [id number]  Referring provider: Vickie Carlos MD  Dx:   Encounter Diagnosis     ICD-10-CM    1  Primary osteoarthritis of left shoulder  M19 012        Start Time: 1052          Subjective: My shoulder is very stiff from shoveling snow  Objective: See treatment diary below      Assessment: Tolerated treatment fair  Patient demonstrated fatigue post treatment, exhibited good technique with therapeutic exercises and would benefit from continued PT      Plan: Progress treatment as tolerated  Precautions: Medical History; new script 22 - ROM, strengthening, No restrictions      Diagnosis Date Comment Source   High blood pressure              Date 2022     Visit # 6 7 8     Manuals 10 10      PROM (avoid ER past 45 degrees) perf as tolerated perf as tolerated perf as sarah     Scap setting scap re ed in R S/L scap re ed in R S/L scap re ed in R S/L                     Neuro Re-Ed 15 15      Pendulums --- --- ----     Scap squeezes 10x 5 sec hold supine 10x 5 sec hold supine [id number] hold  (supine)                                             Ther Ex 20 20      Grippers --- ---      Biceps curls 3# x 20 reps  3# x 20 reps  3# x 20 reps      Pronation/supination ---  ---     Wrist extension/flexion ---  ---     Upper trap stretch ---  ----     FIS blue ball 15 x 5 sec hold 15 x 5 sec hold 20x 5sec hold      AAROM shld flex Pulleys 10 x 10 sec hold  Pulleys 10 x 10 sec hold  10 x 10 sec hold      Low row 2x10 reps 2#  2x10 reps 2#  2# 2 x 10 reps      Rows Blue 2x10 reps Blue 2x10 reps Blue x 20 reps      TB extension Red 2x10 reps Red 2x10 reps Red 2 x 10 reps      Scaption  #1 2x10 1# 2 x 10 reps      Shld abd in sidelying #0 2x10 in R s/l #0 2x10 in R s/l R S/L - L ABD 2x10 reps      Shld iso ER 5" hold 5x 5" hold 5x 10 x 5 sec hold      Ball on wall  Crosses 2x10 2x10 reps         Supine AAROM shld flex holding small red ball x 10 reps      Gait Training                        Modalities Heterosexual